# Patient Record
Sex: FEMALE | Race: WHITE | NOT HISPANIC OR LATINO | Employment: FULL TIME | URBAN - METROPOLITAN AREA
[De-identification: names, ages, dates, MRNs, and addresses within clinical notes are randomized per-mention and may not be internally consistent; named-entity substitution may affect disease eponyms.]

---

## 2017-01-10 RX ORDER — TRAMADOL HYDROCHLORIDE 50 MG/1
50-100 TABLET ORAL
Qty: 90 TABLET | Refills: 0 | Status: SHIPPED | OUTPATIENT
Start: 2017-01-10 | End: 2017-01-20

## 2017-01-10 RX ORDER — ONDANSETRON HYDROCHLORIDE 8 MG/1
8 TABLET, FILM COATED ORAL EVERY 8 HOURS PRN
Qty: 30 TABLET | Refills: 0 | Status: SHIPPED | OUTPATIENT
Start: 2017-01-10

## 2017-02-22 ENCOUNTER — DOCUMENTATION ONLY (OUTPATIENT)
Dept: REHABILITATION | Facility: HOSPITAL | Age: 57
End: 2017-02-22

## 2017-02-22 DIAGNOSIS — M53.86 DECREASED ROM OF LUMBAR SPINE: ICD-10-CM

## 2017-02-22 DIAGNOSIS — M54.50 LUMBAR SPINE PAIN: Primary | ICD-10-CM

## 2017-02-22 NOTE — PROGRESS NOTES
Name: Carmensang Salguero  Referring Provider:  PT Order: PT evaluate and treat  Clinical #: [id number]  Discharge Summary Date: 2/22/2017  Diagnosis:     Patient was seen for 4 OP PT visits from 11/15/16 to 12/19/16. Pt missed 10 scheduled sessions. Treatment included: evaluation, HEP, pt education, aquatic therapy, joint mobilizations, ther ex, and stretching. PT unable to fully assess goal achievement as pt did not return for follow up sessions. This patient is discharged from OP PT Services.    Heron Dyson, PT

## 2024-02-16 ENCOUNTER — TELEPHONE (OUTPATIENT)
Dept: SLEEP MEDICINE | Facility: CLINIC | Age: 64
End: 2024-02-16
Payer: COMMERCIAL

## 2024-02-20 ENCOUNTER — OFFICE VISIT (OUTPATIENT)
Dept: SLEEP MEDICINE | Facility: CLINIC | Age: 64
End: 2024-02-20
Attending: PSYCHIATRY & NEUROLOGY
Payer: COMMERCIAL

## 2024-02-20 ENCOUNTER — PATIENT MESSAGE (OUTPATIENT)
Dept: SLEEP MEDICINE | Facility: CLINIC | Age: 64
End: 2024-02-20

## 2024-02-20 ENCOUNTER — PATIENT MESSAGE (OUTPATIENT)
Dept: ADMINISTRATIVE | Facility: OTHER | Age: 64
End: 2024-02-20
Payer: COMMERCIAL

## 2024-02-20 VITALS
WEIGHT: 155 LBS | HEIGHT: 64 IN | HEART RATE: 79 BPM | BODY MASS INDEX: 26.46 KG/M2 | DIASTOLIC BLOOD PRESSURE: 74 MMHG | SYSTOLIC BLOOD PRESSURE: 117 MMHG

## 2024-02-20 DIAGNOSIS — G47.00 INSOMNIA, UNSPECIFIED TYPE: Primary | ICD-10-CM

## 2024-02-20 PROCEDURE — 3008F BODY MASS INDEX DOCD: CPT | Mod: CPTII,S$GLB,, | Performed by: PSYCHIATRY & NEUROLOGY

## 2024-02-20 PROCEDURE — 3074F SYST BP LT 130 MM HG: CPT | Mod: CPTII,S$GLB,, | Performed by: PSYCHIATRY & NEUROLOGY

## 2024-02-20 PROCEDURE — 99214 OFFICE O/P EST MOD 30 MIN: CPT | Mod: S$GLB,,, | Performed by: PSYCHIATRY & NEUROLOGY

## 2024-02-20 PROCEDURE — 3078F DIAST BP <80 MM HG: CPT | Mod: CPTII,S$GLB,, | Performed by: PSYCHIATRY & NEUROLOGY

## 2024-02-20 PROCEDURE — 99999 PR PBB SHADOW E&M-EST. PATIENT-LVL III: CPT | Mod: PBBFAC,,, | Performed by: PSYCHIATRY & NEUROLOGY

## 2024-02-20 NOTE — PATIENT INSTRUCTIONS
Please email me the sleep study -[email or web address]        Treatment Information:  Insomnia-focused.  This treatment specifically focuses primarily on insomnia.  Therapy only.  Medication management is not included in this treatment.  Please discuss your medications with your referring provider.  Time-limited. This means that services with Dr. Garcia are concluded when treatment ends.    CBT-i group will run for 6 weekly sessions.  Structured. CBT-i involves manualized treatment with structured and pre-determined topics, discussions, and practice assignments tailored to treat insomnia.  Active Treatment. Once admitted to CBT-i, treatment requires your consistent attendance and commitment to daily practice assignments.  Practice assignments include daily sleep diaries and changes in thoughts and behaviors related to sleep.      If you would like more detailed information about CBT-i, please visit the following websites:  https://www.acponline.org/acp-newsroom/mxh-tqrmxbcmli-ylhgclndq-behavioral-therapy-as-initial-treatment-[email or web address]-[email or web address]-insomnia  https://www.sleepfoundation.org/articles/cognitive-behavioral-therapy-insomnia      How to Join CBT-i:  If you are interested in attending CBT-i, please call the Department of Psychiatry appointment line ([phone] ) and request to speak to Rachael Sidhu RN.  Please indicate which cohort you would like to attend.  Each group will be capped at 6-8 members.      Billing & Insurance:  Please check with your insurance about coverage for the group therapy sessions.  The CPT code is 25207 for a group session.  You can call your insurance directly or contact our patient , Jina Kessler, at [phone].      Location:  CBT-i will be located on the 4th floor of the [facility] in the Department of Psychiatry at [facility] main campus on [facility] and telemedicine and individual options are now available.   For the first session, Dr. Garcia will  escort you from the waiting room to the group therapy room.  For subsequent sessions, you may walk to the group therapy room on your own.  Please check in at the  before you walk back to room #[id number].  When you exit the waiting room, you will take your second right past the soda machine through the fire door (you may open this).  You will cross the hallway above the atrium and open the door to a hallway with elevators.  Past the elevators to the left is a glass door with an intercom on the left.  Press the button so the  can buzz you in.  Room #456 is located in the back of the long hallway on the right.      Questions:  If you have any questions please call the Department of Psychiatry appointment line ([phone] - [facility])  and request to speak to Rachael Sidhu RN.  She will direct you to Dr. Garcia if needed.        We hope to speak to you soon!   ________________              Sleep Hygiene Practices     1. Try going to bed only when you are drowsy.  ?   2. If you are unable to fall asleep or stay asleep, leave your bedroom and engage in a quiet activity elsewhere. Do not permit yourself to fall asleep outside the bedroom. Return to bed when and only when you are sleepy. Repeat this process as often as necessary throughout night.   3. Maintain regular wake-up time, even on days off work & weekends   4. Use your bedroom for sleep and sex   5. Do not watch TV in bed  6. Avoid napping during the daytime. If daytime sleepiness becomes overwhelming, limit nap time to a single nap of less than 1hr, no later than 3pm.   7. Distract your mind. Avoid clock watching. Lying in bed unable to sleep and frustrated needs to be avoided. Try reading or watching a videotape or listening to books on tape. It may be necessary to go into another room to do these.   8. Avoid caffeine within 4-6hrs of bedtime   9. Avoid use of nicotine close to bedtime   10. do not drink alcoholic beverages within 4-6hrs of bedtime    11. While a light snack before bedtime can help promote sound sleep, avoid large meals.   12. Obtain regular exercise, but avoid strenuous exercise within 4hrs of bedtime   13. Minimize light, noise, and extremes in temperature in the bedroom.   14. Precautions: The patient was advised to abstain from driving should they feel sleepy or drowsy.

## 2024-02-20 NOTE — PROGRESS NOTES
Carmen Salguero is a 63 y.o. female is here to be evaluated for a sleep disorder; referred by No ref. provider found(Dr. Huang Maddox - will request the records; also her dentist wanted her to be evaluated in the sleep clinic. )    Difficulty with staying asleep - waking up at midnight.    The patient reports making noises on falling asleep, teeth grinding , interrupted sleep   Carmen Salguero denied  gasping for air, excessive daytime sleepiness, and excessive daytime fatigue.    She recently had a sleep study revealing mild sleep apnea.    The patient feels rested upon awakening. Does not take naps.     The patient   denies  dry mouth on awakening.   Carmen Salguero denies  nasal congestion.    HAd neurofeedback - with EEG guidance; started recently,     Pneuma  counseling - 12 - 15 sessions - did 18 to increase slower EEG  waves - only modest improvement so far.     Carmen Salguero  denies symptoms concerning for parasomnia except for occasional somniloquy.  The patient  denies auxiliary symptoms of narcolepsy including sleep onset paralysis, hypnagogic hallucinations, sleep attacks and cataplexy.    Carmen Salguero denied symptoms concerning for RLS; nocturnal leg movements have not been noticed.   The patient does not experience sleep related leg cramps.       Bedtime: 10-10:30   Leep latency fast enough  Awakenings per night: 12 - goes back to sleep on and off. Waking up at least 3 times after midnight. Staying askeeo is the biggest probelm  Wake time: 6 AM    Medications pertinent to sleep  disorders taken currently: Trazodone 100 mg (helped first - stopped working).  Previous  medications taken  for sleep disorders:  Ambien - 10 mg; stopped working after a while); Amitryptptyline; Lunesta - does not recall the dose - not effective.    Sleep studies  Had an HST through her dentist - office - wore a ring like device for a week. Not sure if was a patented HST   device.       Occupation: home  Bed partner: no  Exercise routine: active  Caffeine:  no beverages per day  Alcohol: no  Smoking:no      2024     8:44 AM   EPWORTH SLEEPINESS SCALE TOTAL SCORE    Total score 1             2024     8:44 AM   EPWORTH SLEEPINESS SCALE   Sitting and reading 0   Watching TV 0   Sitting, inactive in a public place (e.g. a theatre or a meeting) 0   As a passenger in a car for an hour without a break 0   Lying down to rest in the afternoon when circumstances permit 1   Sitting and talking to someone 0   Sitting quietly after a lunch without alcohol 0   In a car, while stopped for a few minutes in traffic 0   Total score 1               2024     8:44 AM   EPWORTH SLEEPINESS SCALE   Sitting and reading 0   Watching TV 0   Sitting, inactive in a public place (e.g. a theatre or a meeting) 0   As a passenger in a car for an hour without a break 0   Lying down to rest in the afternoon when circumstances permit 1   Sitting and talking to someone 0   Sitting quietly after a lunch without alcohol 0   In a car, while stopped for a few minutes in traffic 0   Total score 1           DME:         PAST MEDICAL HISTORY:    Active Ambulatory Problems     Diagnosis Date Noted    Perimenopause 2014     Resolved Ambulatory Problems     Diagnosis Date Noted    Decreased ROM of lumbar spine 11/15/2016    Lumbar spine pain 11/15/2016     No Additional Past Medical History                PAST SURGICAL HISTORY:    No past surgical history on file.      FAMILY HISTORY:                Family History   Problem Relation Age of Onset    Breast cancer Neg Hx     Colon cancer Neg Hx     Ovarian cancer Neg Hx        SOCIAL HISTORY:          Tobacco:   Social History     Tobacco Use   Smoking Status Never   Smokeless Tobacco Not on file       alcohol use:    Social History     Substance and Sexual Activity   Alcohol Use Yes                   ALLERGIES:    Review of patient's allergies indicates:  "  Allergen Reactions    Erythropoietin analogues     Septra [sulfamethoxazole-trimethoprim]        CURRENT MEDICATIONS:    Current Outpatient Medications   Medication Sig Dispense Refill    methocarbamol (ROBAXIN) 750 MG Tab Take 1 tablet (750 mg total) by mouth nightly as needed (muscle spasms). 60 tablet 0    ondansetron (ZOFRAN) 8 MG tablet Take 1 tablet (8 mg total) by mouth every 8 (eight) hours as needed for Nausea (prior to taking pain medication). 30 tablet 0     No current facility-administered medications for this visit.                        PHYSICAL EXAM:  /74 (BP Location: Left arm, Patient Position: Sitting, BP Method: Large (Automatic))   Pulse 79   Ht 5' 4" (1.626 m)   Wt 70.3 kg (155 lb)   BMI 26.61 kg/m²   GENERAL: Normal development, well groomed.  HEENT:   HEENT:  Conjunctivae are non-erythematous; Pupils equal, round, and reactive to light; Nose is symmetrical; Nasal mucosa is pink and moist; Septum is midline; Inferior turbinates are normal; Modified Mallampati:II-III;NECK: Supple. Neck circumference is 14 inches. No thyromegaly. No palpable nodes.     SKIN: On face and neck: No abrasions, no rashes, no lesions.  No subcutaneous nodules are palpable.  RESPIRATORY: Chest is clear to auscultation.  Normal chest expansion and non-labored breathing at rest.  CARDIOVASCULAR: Normal S1, S2.  No murmurs, gallops or rubs. No carotid bruits bilaterally.  No edema. No clubbing. No cyanosis.    NEURO: Oriented to time, place and person. Normal attention span and concentration. Gait normal.    PSYCH: Affect is full. Mood is normal  MUSCULOSKELETAL: Moves 4 extremities. Gait normal.           ASSESSMENT:    Insomnoa - maintenacne          PLAN:    Carmen Gerry Noemy will send me her sleep study report. Willl review sleep study done through dental office and  Dr. Maddox -  OA for PRECIOUS  is to consider (already using OA for tooth grinding)      CBTi - in addition to Neurofeedback that she is " currently undergoing  for insomnia      Brochure with Sleep hyguiene and CBTi  Discussed new medications for insomnia - patient not intereste,      During our discussion today, we talked about the etiology of  PRECIOUS as well as the potential ramifications of untreated sleep apnea, which could include daytime sleepiness, hypertension, heart disease and/or stroke.  We discussed potential treatment options, which could include weight loss, body positioning, continuous positive airway pressure (CPAP), or referral for surgical consideration. Meanwhile, she  is urged to avoid supine sleep, weight gain and alcoholic beverages since all of these can worsen PRECIOUS.     The patient was given open opportunity to ask questions and/or express concerns about treatment plan. Two point patient identifier confirmed.       Precautions: The patient was advised to abstain from driving should he feel sleepy or drowsy.    Follow up: MD in 6 months.    ESS ([place] Sleepiness Scale) and other sleep medicine related questionnaires were reviewed with the patient.      46-minute visit. >50% spent counseling patient and coordination of care.  The patient was  cautioned against drowsy driving.